# Patient Record
Sex: FEMALE | Race: AMERICAN INDIAN OR ALASKA NATIVE | NOT HISPANIC OR LATINO | URBAN - METROPOLITAN AREA
[De-identification: names, ages, dates, MRNs, and addresses within clinical notes are randomized per-mention and may not be internally consistent; named-entity substitution may affect disease eponyms.]

---

## 2018-03-25 ENCOUNTER — INPATIENT (INPATIENT)
Facility: HOSPITAL | Age: 43
LOS: 0 days | Discharge: LEFT BEFORE DISCHARGE | DRG: 379 | End: 2018-03-26
Attending: INTERNAL MEDICINE | Admitting: INTERNAL MEDICINE
Payer: MEDICAID

## 2018-03-25 VITALS
TEMPERATURE: 99 F | DIASTOLIC BLOOD PRESSURE: 78 MMHG | HEIGHT: 62 IN | OXYGEN SATURATION: 98 % | WEIGHT: 115.08 LBS | RESPIRATION RATE: 18 BRPM | SYSTOLIC BLOOD PRESSURE: 122 MMHG | HEART RATE: 70 BPM

## 2018-03-25 DIAGNOSIS — K62.5 HEMORRHAGE OF ANUS AND RECTUM: ICD-10-CM

## 2018-03-25 DIAGNOSIS — Z29.9 ENCOUNTER FOR PROPHYLACTIC MEASURES, UNSPECIFIED: ICD-10-CM

## 2018-03-25 LAB
ALBUMIN SERPL ELPH-MCNC: 3.5 G/DL — SIGNIFICANT CHANGE UP (ref 3.5–5)
ALP SERPL-CCNC: 34 U/L — LOW (ref 40–120)
ALT FLD-CCNC: 17 U/L DA — SIGNIFICANT CHANGE UP (ref 10–60)
ANION GAP SERPL CALC-SCNC: 12 MMOL/L — SIGNIFICANT CHANGE UP (ref 5–17)
AST SERPL-CCNC: 25 U/L — SIGNIFICANT CHANGE UP (ref 10–40)
BASOPHILS # BLD AUTO: 0.1 K/UL — SIGNIFICANT CHANGE UP (ref 0–0.2)
BASOPHILS NFR BLD AUTO: 0.8 % — SIGNIFICANT CHANGE UP (ref 0–2)
BILIRUB SERPL-MCNC: 0.4 MG/DL — SIGNIFICANT CHANGE UP (ref 0.2–1.2)
BUN SERPL-MCNC: 16 MG/DL — SIGNIFICANT CHANGE UP (ref 7–18)
CALCIUM SERPL-MCNC: 9 MG/DL — SIGNIFICANT CHANGE UP (ref 8.4–10.5)
CHLORIDE SERPL-SCNC: 106 MMOL/L — SIGNIFICANT CHANGE UP (ref 96–108)
CO2 SERPL-SCNC: 22 MMOL/L — SIGNIFICANT CHANGE UP (ref 22–31)
CREAT SERPL-MCNC: 0.73 MG/DL — SIGNIFICANT CHANGE UP (ref 0.5–1.3)
EOSINOPHIL # BLD AUTO: 0.2 K/UL — SIGNIFICANT CHANGE UP (ref 0–0.5)
EOSINOPHIL NFR BLD AUTO: 3.1 % — SIGNIFICANT CHANGE UP (ref 0–6)
GLUCOSE SERPL-MCNC: 95 MG/DL — SIGNIFICANT CHANGE UP (ref 70–99)
HCG SERPL-ACNC: <1 MIU/ML — SIGNIFICANT CHANGE UP
HCT VFR BLD CALC: 35.9 % — SIGNIFICANT CHANGE UP (ref 34.5–45)
HCT VFR BLD CALC: 36.8 % — SIGNIFICANT CHANGE UP (ref 34.5–45)
HCT VFR BLD CALC: 36.9 % — SIGNIFICANT CHANGE UP (ref 34.5–45)
HGB BLD-MCNC: 11.4 G/DL — LOW (ref 11.5–15.5)
HGB BLD-MCNC: 11.7 G/DL — SIGNIFICANT CHANGE UP (ref 11.5–15.5)
HGB BLD-MCNC: 12.5 G/DL — SIGNIFICANT CHANGE UP (ref 11.5–15.5)
LYMPHOCYTES # BLD AUTO: 1.6 K/UL — SIGNIFICANT CHANGE UP (ref 1–3.3)
LYMPHOCYTES # BLD AUTO: 24.3 % — SIGNIFICANT CHANGE UP (ref 13–44)
MCHC RBC-ENTMCNC: 30.1 PG — SIGNIFICANT CHANGE UP (ref 27–34)
MCHC RBC-ENTMCNC: 30.5 PG — SIGNIFICANT CHANGE UP (ref 27–34)
MCHC RBC-ENTMCNC: 31.8 GM/DL — LOW (ref 32–36)
MCHC RBC-ENTMCNC: 31.8 GM/DL — LOW (ref 32–36)
MCHC RBC-ENTMCNC: 32.2 PG — SIGNIFICANT CHANGE UP (ref 27–34)
MCHC RBC-ENTMCNC: 33.8 GM/DL — SIGNIFICANT CHANGE UP (ref 32–36)
MCV RBC AUTO: 94.7 FL — SIGNIFICANT CHANGE UP (ref 80–100)
MCV RBC AUTO: 95.3 FL — SIGNIFICANT CHANGE UP (ref 80–100)
MCV RBC AUTO: 95.8 FL — SIGNIFICANT CHANGE UP (ref 80–100)
MONOCYTES # BLD AUTO: 0.4 K/UL — SIGNIFICANT CHANGE UP (ref 0–0.9)
MONOCYTES NFR BLD AUTO: 6.4 % — SIGNIFICANT CHANGE UP (ref 2–14)
NEUTROPHILS # BLD AUTO: 4.4 K/UL — SIGNIFICANT CHANGE UP (ref 1.8–7.4)
NEUTROPHILS NFR BLD AUTO: 65.4 % — SIGNIFICANT CHANGE UP (ref 43–77)
PLATELET # BLD AUTO: 190 K/UL — SIGNIFICANT CHANGE UP (ref 150–400)
PLATELET # BLD AUTO: 196 K/UL — SIGNIFICANT CHANGE UP (ref 150–400)
PLATELET # BLD AUTO: 198 K/UL — SIGNIFICANT CHANGE UP (ref 150–400)
POTASSIUM SERPL-MCNC: 4.4 MMOL/L — SIGNIFICANT CHANGE UP (ref 3.5–5.3)
POTASSIUM SERPL-SCNC: 4.4 MMOL/L — SIGNIFICANT CHANGE UP (ref 3.5–5.3)
PROT SERPL-MCNC: 7.3 G/DL — SIGNIFICANT CHANGE UP (ref 6–8.3)
RBC # BLD: 3.74 M/UL — LOW (ref 3.8–5.2)
RBC # BLD: 3.87 M/UL — SIGNIFICANT CHANGE UP (ref 3.8–5.2)
RBC # BLD: 3.89 M/UL — SIGNIFICANT CHANGE UP (ref 3.8–5.2)
RBC # FLD: 11.8 % — SIGNIFICANT CHANGE UP (ref 10.3–14.5)
RBC # FLD: 11.9 % — SIGNIFICANT CHANGE UP (ref 10.3–14.5)
RBC # FLD: 11.9 % — SIGNIFICANT CHANGE UP (ref 10.3–14.5)
SODIUM SERPL-SCNC: 140 MMOL/L — SIGNIFICANT CHANGE UP (ref 135–145)
TROPONIN I SERPL-MCNC: <0.015 NG/ML — SIGNIFICANT CHANGE UP (ref 0–0.04)
WBC # BLD: 6.8 K/UL — SIGNIFICANT CHANGE UP (ref 3.8–10.5)
WBC # BLD: 8.4 K/UL — SIGNIFICANT CHANGE UP (ref 3.8–10.5)
WBC # BLD: 8.5 K/UL — SIGNIFICANT CHANGE UP (ref 3.8–10.5)
WBC # FLD AUTO: 6.8 K/UL — SIGNIFICANT CHANGE UP (ref 3.8–10.5)
WBC # FLD AUTO: 8.4 K/UL — SIGNIFICANT CHANGE UP (ref 3.8–10.5)
WBC # FLD AUTO: 8.5 K/UL — SIGNIFICANT CHANGE UP (ref 3.8–10.5)

## 2018-03-25 PROCEDURE — 99284 EMERGENCY DEPT VISIT MOD MDM: CPT | Mod: 25

## 2018-03-25 PROCEDURE — 71045 X-RAY EXAM CHEST 1 VIEW: CPT | Mod: 26

## 2018-03-25 RX ORDER — SODIUM CHLORIDE 9 MG/ML
1000 INJECTION INTRAMUSCULAR; INTRAVENOUS; SUBCUTANEOUS ONCE
Qty: 0 | Refills: 0 | Status: COMPLETED | OUTPATIENT
Start: 2018-03-25 | End: 2018-03-25

## 2018-03-25 RX ORDER — PANTOPRAZOLE SODIUM 20 MG/1
40 TABLET, DELAYED RELEASE ORAL EVERY 12 HOURS
Qty: 0 | Refills: 0 | Status: DISCONTINUED | OUTPATIENT
Start: 2018-03-25 | End: 2018-03-26

## 2018-03-25 RX ORDER — SODIUM CHLORIDE 9 MG/ML
1000 INJECTION INTRAMUSCULAR; INTRAVENOUS; SUBCUTANEOUS
Qty: 0 | Refills: 0 | Status: DISCONTINUED | OUTPATIENT
Start: 2018-03-25 | End: 2018-03-26

## 2018-03-25 RX ORDER — PANTOPRAZOLE SODIUM 20 MG/1
80 TABLET, DELAYED RELEASE ORAL ONCE
Qty: 0 | Refills: 0 | Status: COMPLETED | OUTPATIENT
Start: 2018-03-25 | End: 2018-03-25

## 2018-03-25 RX ADMIN — SODIUM CHLORIDE 1000 MILLILITER(S): 9 INJECTION INTRAMUSCULAR; INTRAVENOUS; SUBCUTANEOUS at 18:15

## 2018-03-25 RX ADMIN — PANTOPRAZOLE SODIUM 40 MILLIGRAM(S): 20 TABLET, DELAYED RELEASE ORAL at 18:03

## 2018-03-25 RX ADMIN — SODIUM CHLORIDE 100 MILLILITER(S): 9 INJECTION INTRAMUSCULAR; INTRAVENOUS; SUBCUTANEOUS at 18:03

## 2018-03-25 RX ADMIN — SODIUM CHLORIDE 1000 MILLILITER(S): 9 INJECTION INTRAMUSCULAR; INTRAVENOUS; SUBCUTANEOUS at 10:24

## 2018-03-25 NOTE — ED ADULT NURSE NOTE - ED STAT RN HANDOFF DETAILS
report given to PUMA Lizarraga, pt on cont. cardiac monitoring,  no Rhythm changes. No bleeding noted the whole shift.

## 2018-03-25 NOTE — ED PROVIDER NOTE - MEDICAL DECISION MAKING DETAILS
BRBPR. May be internal hemorrhoids vs. enteritis from Swift's. Abdomen benign. Given fluids. Signed off care to Dr. Flannery, anticipate discharge with close PMD f/u.

## 2018-03-25 NOTE — H&P ADULT - HISTORY OF PRESENT ILLNESS
42y/ oF, from home, no PMhx , p/w blood per rectum. Patient reports that it happened yesterday night, She has big BM which has fresh blood in it. After that she felt lightheaded and SOB. She denies any LOC. [place], after that she had BRBPR in am. Denies abdominal pain, dysuria, hematuria, vaginal bleeding or discharge, chest pain, headache. Patient last LMP was on 3/6/18. Patient reports taking ibuprofen once a month for dysmenorrhea. Patient reports feeling better in ED.     Allergy: NKDA    In the ED initial vitals shows BP of 122/78, later was 100/58, labs with Hb 12.5, BMP ok, troponin  negative,  got 2 L bolus, GI on call was called by ED , recommended admission and possible colonoscopy in am, admitted for GI bleed.

## 2018-03-25 NOTE — H&P ADULT - PROBLEM SELECTOR PLAN 1
Patient has BRBPR, hxc of haemorrhoides  Painless, afebrile, no wbc count  Patient has total 4 bloody BMs  Vitals BP on lower side, also reports it was large amount of blood and she had near syncope  Guaic was done by ED that was positive, fresh blood in stool   S/p 2 L bolus in ED, will do the IVF   Patient currently refused orthostatics, reports that she wants to do later   Will admit for lower GI bleed,  clear liquid diet for now   ED discussed with Dr Guzmán recommended admission for possible colonoscopy   Patient refused the procedure and would like to speak with Gastroenterologist   For now will monitor the cbc q8, IVf, NPO after midnight   ECG with NSR with sinus arrythmia, no prior ECG to compare   Montior on tele given near syncope   Gi consult Dr Guzmán Patient has BRBPR, hxc of haemorrhoides  Painless, afebrile, no wbc count  Patient has total 4 bloody BMs  Vitals BP on lower side, also reports it was large amount of blood and she had near syncope  Guaic was done by ED that was positive, fresh blood in stool   S/p 2 L bolus in ED, will do the IVF   Patient currently refused orthostatics, reports that she wants to do later   Will admit for lower GI bleed,  clear liquid diet for now   ED discussed with Dr Guzmán recommended admission for possible colonoscopy   Patient refused the procedure and would like to speak with Gastroenterologist   For now will monitor the cbc q8, IVf, NPO after midnight   ECG with NSR with sinus arrythmia, no prior ECG to compare   Montior on tele given near syncope   f/up on orthostatics   Gi consult Dr Guzmán

## 2018-03-25 NOTE — ED PROVIDER NOTE - PHYSICAL EXAMINATION
Afebrile, hemodynamically stable  NAD, well appearing  Head NCAT  EOMI grossly, anicteric without pallor  MMM  RRR, nml S1/S2, no m/r/g  Lungs CTAB, no w/r/r  Abd soft, NT, ND, nml BS, no rebound or guarding  Rect (PCA Ambili as chaperone): red blood, hemoccult positive  AAO, CN's 3-12 grossly intact  CHINO spontaneously, no leg cyanosis or edema  Skin warm, well perfused, no rashes or hives

## 2018-03-25 NOTE — ED PROVIDER NOTE - PROGRESS NOTE DETAILS
repeat HH basically the same but pt still with might lightheadedness after 2 liters, dw Dr rosales who recommends admission

## 2018-03-25 NOTE — H&P ADULT - GASTROINTESTINAL DETAILS
normal/no bruit/no rebound tenderness/no masses palpable/no organomegaly/no rigidity/soft/nontender/no distention/bowel sounds normal/no guarding

## 2018-03-25 NOTE — ED PROVIDER NOTE - OBJECTIVE STATEMENT
42yoF previously healthy p/w blood per rectum, states had BM that was dark red which bright red blood in toilet bowl x 2 today, immediately after each time felt lightheadedness and SOB, and after the first time she had a syncopal episode x1 min with incontinence, no convulsions or confusion following. [place], which is unusual for her. Denies abdominal pain, dysuria, hematuria, vaginal bleeding or discharge, or history of this.  No meds

## 2018-03-25 NOTE — ED ADULT NURSE REASSESSMENT NOTE - NS ED NURSE REASSESS COMMENT FT1
well rested not in any distress, denies any bleeding at this time, IV left AC no swelling or redness noted, will cont. care.

## 2018-03-25 NOTE — H&P ADULT - MS EXT PE MLT D E PC
Patient accepted and admission order received from:: ZHEN SOTELO [[id number]]   Patient Class: Inpatient [1]   Patient on Telemetry: Yes   Has physician to physician communication occurred?: Yes   Transferring Patient to? Only adjust for transfers between [facility] ([facility], [facility], [facility]: [facility] [[id number]]  
no clubbing/no pedal edema/no cyanosis

## 2018-03-25 NOTE — ED ADULT NURSE NOTE - OBJECTIVE STATEMENT
pt stated she passed out twice.  Once when she went to the bathroom to have a bm and it was only blood and the second time she passed out she was incontinent

## 2018-03-25 NOTE — H&P ADULT - PROBLEM SELECTOR PLAN 2
IMPROVE VTE Individual Risk Assessment    RISK                                                          Points  [] Previous VTE                                           3  [] Thrombophilia                                        2  [] Lower limb paralysis                              2   [] Current Cancer                                       2   [] Immobilization > 24 hrs                        1  [] ICU/CCU stay > 24 hours                       1  [] Age > 60                                                   1    IMPROVE VTE Score: 0, hold the chemical DVT ppx as patient has bleeding   Gi ppx with protonix

## 2018-03-25 NOTE — H&P ADULT - RS GEN PE MLT RESP DETAILS PC
no intercostal retractions/no rhonchi/clear to auscultation bilaterally/respirations non-labored/no subcutaneous emphysema/breath sounds equal/no wheezes/good air movement/no chest wall tenderness/no rales/airway patent

## 2018-03-25 NOTE — H&P ADULT - MUSCULOSKELETAL
detailed exam no joint warmth/normal strength/no joint swelling/no joint erythema/ROM intact/no calf tenderness

## 2018-03-25 NOTE — ED ADULT TRIAGE NOTE - CHIEF COMPLAINT QUOTE
bloody stool this morning, dizziness,passed out on the floor and urinated on myself  this morning,abdominal pain

## 2018-03-26 ENCOUNTER — TRANSCRIPTION ENCOUNTER (OUTPATIENT)
Age: 43
End: 2018-03-26

## 2018-03-26 VITALS
TEMPERATURE: 98 F | SYSTOLIC BLOOD PRESSURE: 106 MMHG | HEART RATE: 90 BPM | DIASTOLIC BLOOD PRESSURE: 65 MMHG | RESPIRATION RATE: 18 BRPM | OXYGEN SATURATION: 100 %

## 2018-03-26 LAB
APPEARANCE UR: CLEAR — SIGNIFICANT CHANGE UP
BACTERIA # UR AUTO: ABNORMAL /HPF
BILIRUB UR-MCNC: NEGATIVE — SIGNIFICANT CHANGE UP
COLOR SPEC: YELLOW — SIGNIFICANT CHANGE UP
DIFF PNL FLD: NEGATIVE — SIGNIFICANT CHANGE UP
EPI CELLS # UR: SIGNIFICANT CHANGE UP /HPF
GLUCOSE UR QL: NEGATIVE — SIGNIFICANT CHANGE UP
HYALINE CASTS # UR AUTO: ABNORMAL /LPF
KETONES UR-MCNC: NEGATIVE — SIGNIFICANT CHANGE UP
LEUKOCYTE ESTERASE UR-ACNC: NEGATIVE — SIGNIFICANT CHANGE UP
NITRITE UR-MCNC: NEGATIVE — SIGNIFICANT CHANGE UP
PH UR: 6 — SIGNIFICANT CHANGE UP (ref 5–8)
PROT UR-MCNC: NEGATIVE — SIGNIFICANT CHANGE UP
RBC CASTS # UR COMP ASSIST: ABNORMAL /HPF (ref 0–2)
SP GR SPEC: 1.01 — SIGNIFICANT CHANGE UP (ref 1.01–1.02)
UROBILINOGEN FLD QL: NEGATIVE — SIGNIFICANT CHANGE UP
WBC UR QL: SIGNIFICANT CHANGE UP /HPF (ref 0–5)

## 2018-03-26 PROCEDURE — 74177 CT ABD & PELVIS W/CONTRAST: CPT

## 2018-03-26 PROCEDURE — 86901 BLOOD TYPING SEROLOGIC RH(D): CPT

## 2018-03-26 PROCEDURE — 84484 ASSAY OF TROPONIN QUANT: CPT

## 2018-03-26 PROCEDURE — 86900 BLOOD TYPING SEROLOGIC ABO: CPT

## 2018-03-26 PROCEDURE — 84702 CHORIONIC GONADOTROPIN TEST: CPT

## 2018-03-26 PROCEDURE — 74177 CT ABD & PELVIS W/CONTRAST: CPT | Mod: 26

## 2018-03-26 PROCEDURE — 36415 COLL VENOUS BLD VENIPUNCTURE: CPT

## 2018-03-26 PROCEDURE — 85027 COMPLETE CBC AUTOMATED: CPT

## 2018-03-26 PROCEDURE — 86850 RBC ANTIBODY SCREEN: CPT

## 2018-03-26 PROCEDURE — 71045 X-RAY EXAM CHEST 1 VIEW: CPT

## 2018-03-26 PROCEDURE — 93005 ELECTROCARDIOGRAM TRACING: CPT

## 2018-03-26 PROCEDURE — 80053 COMPREHEN METABOLIC PANEL: CPT

## 2018-03-26 PROCEDURE — 81001 URINALYSIS AUTO W/SCOPE: CPT

## 2018-03-26 PROCEDURE — 99285 EMERGENCY DEPT VISIT HI MDM: CPT | Mod: 25

## 2018-03-26 PROCEDURE — 82962 GLUCOSE BLOOD TEST: CPT

## 2018-03-26 RX ORDER — PANTOPRAZOLE SODIUM 20 MG/1
1 TABLET, DELAYED RELEASE ORAL
Qty: 30 | Refills: 0 | OUTPATIENT
Start: 2018-03-26 | End: 2018-04-24

## 2018-03-26 NOTE — DISCHARGE NOTE ADULT - PATIENT PORTAL LINK FT
You can access the [facility] Patient Portal, offered by [facility], by registering with the following website: [email or web address]

## 2018-03-26 NOTE — CONSULT NOTE ADULT - ASSESSMENT
Rectal Bleeding possibly secondary to finding of vascular malformation vs hemorrhoids  1.  Pt signing out AMA  2. Pt agreed to be seen by gastroenterology as outpatient  3. Advised pt to f/u with PCP and may need Interventional radiology intervention for possible embolization if bleeding continues and/or inability to stop bleeding with GI.   4. pt demonstrated understanding  5. D/w Dr. Choudhury and agreed.

## 2018-03-26 NOTE — DISCHARGE NOTE ADULT - PLAN OF CARE
patient will remain symptom free You presented to the hospital with complaints of blood in your stool. CT scan was done and was negative for bleeding however showed possible vascular malformations. Surgery was consulted to see you however you opted to leave against medical advice.

## 2018-03-26 NOTE — DISCHARGE NOTE ADULT - HOSPITAL COURSE
42y old F, from home, no PMhx , p/w blood per rectum. Patient reports that it happened the night prior to admission, She had a big BM which had fresh blood in it. After that she felt lightheaded and SOB. She denies any LOC. She [place]'s [place], after that she had BRBPR in am. Denies abdominal pain, dysuria, hematuria, vaginal bleeding or discharge, chest pain, headache. Patient last LMP was on 3/6/18. Patient reports taking ibuprofen once a month for dysmenorrhea. Patient reported feeling better in ED.     In the ED initial vitals showed BP of 122/78, later was 100/58, labs with Hb 12.5, BMP wnl, troponin  negative,  got 2 L bolus, GI on call was called by ED , recommended admission and possible colonoscopy in am, admitted for GI bleed.   CT scan was done and negative for bleed; however showed possibility of vascular malformation. Surgical consult was called to see patient. Patient upset that has not been seen by GI so requested to sign out AMA. Results reviewed with patient and family and risks of leaving explained. Patient stated understanding of risks. Attending Faustino made aware. AMA document completed and placed in chart.

## 2018-03-26 NOTE — DISCHARGE NOTE ADULT - CARE PROVIDER_API CALL
Jersey Diez), Medicine  [address] [place] [address]  Phone: [phone]  Fax: [phone]    Nader Harmon (MD), Internal Medicine  [address] [place] [address]  Phone: [phone]  Fax: [phone]    Danny Vaz), Gastroenterology; Internal Medicine  [address] [place] [address]  Phone: [phone]  Fax: [phone]

## 2018-03-26 NOTE — DISCHARGE NOTE ADULT - MEDICATION SUMMARY - MEDICATIONS TO TAKE
I will START or STAY ON the medications listed below when I get home from the hospital:    Protonix 20 mg oral delayed release tablet  -- 1 tab(s) by mouth once a day  -- Indication: For Prophylactic measure

## 2018-03-26 NOTE — CHART NOTE - NSCHARTNOTEFT_GEN_A_CORE
On assessment of patient, patient requesting to leave AMA. Plan of care explained to patient and family at bedside. Patient upset that GI has not seen her and has not eaten in "35hrs", and would like to look for own GI. Confirmed that diet ordered for patient since 3/25/18 afternoon. CT scan results reviewed with patient and family. Advised to stay for surgery evaluation. Special surgery called with consult and will see patient. Relayed information to patient and family ; however still would like to leave AMA. Agreed to stay for some time to wait for surgery. Attending Faustino aware.

## 2018-03-26 NOTE — PROGRESS NOTE ADULT - SUBJECTIVE AND OBJECTIVE BOX
42y/ oF, from home, no PMhx , p/w blood per rectum. Patient reports that it happened yesterday night, She has big BM which has fresh blood in it. After that she felt lightheaded and SOB. She denies any LOC. She [place] the [place], after that she had BRBPR in am. Denies abdominal pain, dysuria, hematuria, vaginal bleeding or discharge, chest pain, headache. Patient last LMP was on 3/6/18. Patient reports taking ibuprofen once a month for dysmenorrhea. Patient reports feeling better in ED.     Allergy: NKDA    In the ED initial vitals shows BP of 122/78, later was 100/58, labs with Hb 12.5, BMP ok, troponin  negative,  got 2 L bolus, GI on call was called by ED , recommended admission and possible colonoscopy in am, admitted for GI bleed.      Review of Systems:  Other Review of Systems: All other review of systems negative, except as noted in HPI	      pt seen in ed [  ], reg med floor [   ], bed [ x ], chair at bedside [   ], a+o x3 [ x ], lethargic [  ],  nad [ x ]      Allergies    No Known Allergies        Vitals    T(F): 98.1 (03-26-18 @ 07:36), Max: 98.3 (03-26-18 @ 00:46)  HR: 90 (03-26-18 @ 07:36) (74 - 90)  BP: 106/65 (03-26-18 @ 07:36) (106/65 - 120/43)  RR: 18 (03-26-18 @ 07:36) (18 - 18)  SpO2: 100% (03-26-18 @ 07:36) (97% - 100%)  Wt(kg): --  CAPILLARY BLOOD GLUCOSE      POCT Blood Glucose.: 106 mg/dL (25 Mar 2018 05:44)      Labs                          11.7   8.5   )-----------( 196      ( 25 Mar 2018 18:41 )             36.8       03-25    140  |  106  |  16  ----------------------------<  95  4.4   |  22  |  0.73    Ca    9.0      25 Mar 2018 07:03    TPro  7.3  /  Alb  3.5  /  TBili  0.4  /  DBili  x   /  AST  25  /  ALT  17  /  AlkPhos  34<L>  03-25      CARDIAC MARKERS ( 25 Mar 2018 07:03 )  <0.015 ng/mL / x     / x     / x     / x            Radiology Results    < from: CT Abdomen and Pelvis w/ IV Cont (03.26.18 @ 00:03) >  IMPRESSION:  Hyperattenuating focus in the proximal ascending colon is suspicious for   blush of active contrast extravasation.  This appears intimately   associated with a branch of the superior mesenteric vein, raising the   possibility of an underlying vascular malformation.  No evidence of   colitis or diverticulitis.  No hemoperitoneum or retroperitoneal   hemorrhage.    < end of copied text >        Meds    MEDICATIONS  (STANDING):  pantoprazole  Injectable 40 milliGRAM(s) IV Push every 12 hours  sodium chloride 0.9%. 1000 milliLiter(s) (100 mL/Hr) IV Continuous <Continuous>      MEDICATIONS  (PRN):      Physical Exam    Neuro :  no focal deficits  Respiratory: CTA B/L  CV: RRR, S1S2, no murmurs,   Abdominal: Soft, NT, ND +BS,  Extremities: No edema, + peripheral pulses    ASSESSMENT    ascending colon bleed poss 2nd to avm  No pertinent past medical history      PLAN    gi cons  ct abd-pelv result noted above  surg cons  cont ppi  cont ivf  monitor cbc 42y/ oF, from home, no PMhx , p/w blood per rectum. Patient reports that it happened yesterday night, She has big BM which has fresh blood in it. After that she felt lightheaded and SOB. She denies any LOC. She [place] the [place], after that she had BRBPR in am. Denies abdominal pain, dysuria, hematuria, vaginal bleeding or discharge, chest pain, headache. Patient last LMP was on 3/6/18. Patient reports taking ibuprofen once a month for dysmenorrhea. Patient reports feeling better in ED.     Allergy: NKDA    In the ED initial vitals shows BP of 122/78, later was 100/58, labs with Hb 12.5, BMP ok, troponin  negative,  got 2 L bolus, GI on call was called by ED , recommended admission and possible colonoscopy in am, admitted for GI bleed.      Review of Systems:  Other Review of Systems: All other review of systems negative, except as noted in HPI	      pt seen in ed [ x ], reg med floor [   ], bed [ x ], chair at bedside [   ], a+o x3 [ x ], lethargic [  ],  nad [ x ]      Allergies    No Known Allergies        Vitals    T(F): 98.1 (03-26-18 @ 07:36), Max: 98.3 (03-26-18 @ 00:46)  HR: 90 (03-26-18 @ 07:36) (74 - 90)  BP: 106/65 (03-26-18 @ 07:36) (106/65 - 120/43)  RR: 18 (03-26-18 @ 07:36) (18 - 18)  SpO2: 100% (03-26-18 @ 07:36) (97% - 100%)  Wt(kg): --  CAPILLARY BLOOD GLUCOSE      POCT Blood Glucose.: 106 mg/dL (25 Mar 2018 05:44)      Labs                          11.7   8.5   )-----------( 196      ( 25 Mar 2018 18:41 )             36.8       03-25    140  |  106  |  16  ----------------------------<  95  4.4   |  22  |  0.73    Ca    9.0      25 Mar 2018 07:03    TPro  7.3  /  Alb  3.5  /  TBili  0.4  /  DBili  x   /  AST  25  /  ALT  17  /  AlkPhos  34<L>  03-25      CARDIAC MARKERS ( 25 Mar 2018 07:03 )  <0.015 ng/mL / x     / x     / x     / x            Radiology Results    < from: CT Abdomen and Pelvis w/ IV Cont (03.26.18 @ 00:03) >  IMPRESSION:  Hyperattenuating focus in the proximal ascending colon is suspicious for   blush of active contrast extravasation.  This appears intimately   associated with a branch of the superior mesenteric vein, raising the   possibility of an underlying vascular malformation.  No evidence of   colitis or diverticulitis.  No hemoperitoneum or retroperitoneal   hemorrhage.    < end of copied text >        Meds    MEDICATIONS  (STANDING):  pantoprazole  Injectable 40 milliGRAM(s) IV Push every 12 hours  sodium chloride 0.9%. 1000 milliLiter(s) (100 mL/Hr) IV Continuous <Continuous>      MEDICATIONS  (PRN):      Physical Exam    Neuro :  no focal deficits  Respiratory: CTA B/L  CV: RRR, S1S2, no murmurs,   Abdominal: Soft, NT, ND +BS,  Extremities: No edema, + peripheral pulses    ASSESSMENT    ascending colon bleed poss 2nd to avm  No pertinent past medical history      PLAN    gi cons  ct abd-pelv result noted above  surg cons  cont ppi  cont ivf  monitor cbc

## 2018-03-26 NOTE — CONSULT NOTE ADULT - SUBJECTIVE AND OBJECTIVE BOX
Urology  Consultation Note    Patient is a 42y old  Female who presents with a chief complaint of bright red bleeding per rectum -- r/o GI bleed (26 Mar 2018 10:07)    INTERVAL HPI: 43 yo F h/o hemorrhoids c/o bright red blood per rectum yesterday x 3 each with bowel movement.  Pt states had lightheadedness and loss of consciousness for 1 minute after the 2nd episode.  Pt states has had bleeding before secondary to hemorrhoids but not recurrent bleeding.  Pt denies abdominal pain, nausea or vomiting.  She is very hungry. She is signing out AMA.    Admssion HPI:  42y/ oF, from home, no PMhx , p/w blood per rectum. Patient reports that it happened yesterday night, She has big BM which has fresh blood in it. After that she felt lightheaded and SOB. She denies any LOC. She [place], after that she had BRBPR in am. Denies abdominal pain, dysuria, hematuria, vaginal bleeding or discharge, chest pain, headache. Patient last LMP was on 3/6/18. Patient reports taking ibuprofen once a month for dysmenorrhea. Patient reports feeling better in ED.     Allergy: NKDA    PAST MEDICAL & SURGICAL HISTORY:  No pertinent past medical history      Allergies    No Known Allergies    Intolerances        MEDICATIONS  (STANDING):  pantoprazole  Injectable 40 milliGRAM(s) IV Push every 12 hours  sodium chloride 0.9%. 1000 milliLiter(s) (100 mL/Hr) IV Continuous <Continuous>    MEDICATIONS  (PRN):      Vital Signs Last 24 Hrs  T(C): 36.7 (26 Mar 2018 07:36), Max: 36.8 (25 Mar 2018 15:34)  T(F): 98.1 (26 Mar 2018 07:36), Max: 98.3 (26 Mar 2018 00:46)  HR: 90 (26 Mar 2018 07:36) (74 - 90)  BP: 106/65 (26 Mar 2018 07:36) (106/65 - 120/43)  BP(mean): --  RR: 18 (26 Mar 2018 07:36) (18 - 18)  SpO2: 100% (26 Mar 2018 07:36) (97% - 100%)    Physical Exam:  Gen: awake, alert oriented NAD  Abd: soft NT ND  Rectal: pt refused    Labs:                          11.7   8.5   )-----------( 196      ( 25 Mar 2018 18:41 )             36.8         140  |  106  |  16  ----------------------------<  95  4.4   |  22  |  0.73    Ca    9.0      25 Mar 2018 07:03    TPro  7.3  /  Alb  3.5  /  TBili  0.4  /  DBili  x   /  AST  25  /  ALT  17  /  AlkPhos  34<L>        Urinalysis Basic - ( 26 Mar 2018 00:15 )    Color: Yellow / Appearance: Clear / S.010 / pH: x  Gluc: x / Ketone: Negative  / Bili: Negative / Urobili: Negative   Blood: x / Protein: Negative / Nitrite: Negative   Leuk Esterase: Negative / RBC: 2-5 /HPF / WBC 0-2 /HPF   Sq Epi: x / Non Sq Epi: Few /HPF / Bacteria: Moderate /HPF        Radiological Exams:  < from: CT Abdomen and Pelvis w/ IV Cont (18 @ 00:03) >    EXAM:  CT ABDOMEN AND PELVIS IC                            PROCEDURE DATE:  2018          INTERPRETATION:  CLINICAL INFORMATION: Bloody diarrhea.    TECHNIQUE:   Axial CT images were acquired through the abdomen and pelvis.  Intravenous contrast:  75 cc of Omnipaque-350 mg/ml were administered,   and 25 cc were discarded.  Oral contrast:  None.  Coronal and sagittal reformats were generated.     COMPARISON STUDY:  None    FINDINGS:  Visualized lower chest: Within normal limits.    Liver: Within normal limits.  Bile ducts: Within normal limits.  Gallbladder: Within normal limits.  Spleen: Within normal limits.  Pancreas: Within normal limits.  Adrenal glands: Within normal limits.  Kidneys/ureters: Tiny left upper pole renal cyst.  No hydronephrosis or   renal stones.    Bladder: Within normal limits.  Reproductive organs: Approximately 3.4 cm partly myometrial partly   subserosal fibroid in the posterior fundus.    Bowel: There is a hyperattenuating focus in the proximal ascendingcolon   suspicious for blush of active contrast extravasation (2:65-66).  This   appears intimately associated with a branch of the superior mesenteric   vein, raising possibility of an underlying vascular malformation.  No   bowel obstruction, bowelwall thickening or mesenteric inflammatory   change.  Normal appendix.  Peritoneum: No hemoperitoneum, ascites or free air.    Retroperitoneum: No lymphadenopathy or hemorrhage.  Vasculature: Within normal limits.    Abdominal wall/soft tissues: Within normal limits.  Bones: Within normal limits.      IMPRESSION:  Hyperattenuating focus in the proximal ascending colon is suspicious for   blush of active contrast extravasation.  This appears intimately   associated with a branch of the superior mesenteric vein, raising the   possibility of an underlying vascular malformation.  No evidence of   colitis or diverticulitis.  No hemoperitoneum or retroperitoneal   hemorrhage.                AMY RENTERIA M.D.,ATTENDING RADIOLOGIST  This documenthas been electronically signed. Mar 26 2018  1:34AM                < end of copied text >

## 2018-03-26 NOTE — CHART NOTE - NSCHARTNOTEFT_GEN_A_CORE
patient admitted with lower GI bleed, CT abdomen showed hyperattenuating focus in the proximal ascending colon suspicious for blush of active contrast extravasation (2:65-66). appears intimately associated with a branch of the superior mesenteric vein, raising possibility of an underlying vascular malformation.  patient currently HD stable, no active bleeding, H/H stable and pt is asymptomatic ,   will consult surgery   GI Dr Vaz on board

## 2018-03-26 NOTE — DISCHARGE NOTE ADULT - CARE PLAN
Principal Discharge DX:	Blood per rectum  Goal:	patient will remain symptom free  Assessment and plan of treatment:	You presented to the hospital with complaints of blood in your stool. CT scan was done and was negative for bleeding however showed possible vascular malformations. Surgery was consulted to see you however you opted to leave against medical advice.

## 2020-12-01 NOTE — ED ADULT TRIAGE NOTE - HEIGHT IN CM
sertraline (ZOLOFT) 100 MG tablet 30 tablet 5 10/30/2020     Sig - Route: Take 1 tablet by mouth daily. - Oral    Sent to pharmacy as: Sertraline HCl 100 MG Oral Tablet (ZOLOFT)    Class: Eprescribe      hydrOXYzine (VISTARIL) 25 MG capsule 60 capsule 2 10/30/2020     Sig - Route: Take 1 capsule by mouth 3 times daily as needed for Anxiety. - Oral    Sent to pharmacy as: hydrOXYzine Pamoate 25 MG Oral Capsule (VISTARIL)      zolpidem (AMBIEN) 5 MG tablet 30 tablet 5 10/30/2020     Sig - Route: Take 1 tablet by mouth nightly as needed for Sleep. - Oral    Sent to pharmacy as: Zolpidem Tartrate 5 MG Oral Tablet (AMBIEN)      Last refill 10/30/20  Last office visit 9/01/20  Last labs NA  No upcoming appt       157.48

## 2025-07-29 ENCOUNTER — ASOB RESULT (OUTPATIENT)
Age: 50
End: 2025-07-29

## 2025-07-29 ENCOUNTER — APPOINTMENT (OUTPATIENT)
Facility: CLINIC | Age: 50
End: 2025-07-29
Payer: MEDICAID

## 2025-07-29 VITALS — DIASTOLIC BLOOD PRESSURE: 76 MMHG | SYSTOLIC BLOOD PRESSURE: 115 MMHG

## 2025-07-29 LAB
HCG UR QL: NEGATIVE
HEMOGLOBIN: 13.4

## 2025-07-29 PROCEDURE — 99386 PREV VISIT NEW AGE 40-64: CPT

## 2025-07-29 PROCEDURE — 99204 OFFICE O/P NEW MOD 45 MIN: CPT | Mod: 25

## 2025-07-29 PROCEDURE — 81025 URINE PREGNANCY TEST: CPT

## 2025-07-29 PROCEDURE — 76830 TRANSVAGINAL US NON-OB: CPT

## 2025-07-29 PROCEDURE — 85018 HEMOGLOBIN: CPT | Mod: QW

## 2025-07-29 PROCEDURE — 99459 PELVIC EXAMINATION: CPT

## 2025-08-01 LAB — HPV HIGH+LOW RISK DNA PNL CVX: NOT DETECTED

## 2025-08-03 LAB — CYTOLOGY CVX/VAG DOC THIN PREP: NORMAL
